# Patient Record
Sex: MALE | Race: WHITE
[De-identification: names, ages, dates, MRNs, and addresses within clinical notes are randomized per-mention and may not be internally consistent; named-entity substitution may affect disease eponyms.]

---

## 2019-01-22 ENCOUNTER — HOSPITAL ENCOUNTER (OUTPATIENT)
Dept: HOSPITAL 53 - M OPP | Age: 49
Discharge: HOME | End: 2019-01-22
Attending: SURGERY
Payer: COMMERCIAL

## 2019-01-22 VITALS — BODY MASS INDEX: 37.62 KG/M2 | WEIGHT: 254 LBS | HEIGHT: 69 IN

## 2019-01-22 VITALS — DIASTOLIC BLOOD PRESSURE: 98 MMHG | SYSTOLIC BLOOD PRESSURE: 148 MMHG

## 2019-01-22 DIAGNOSIS — Z86.010: ICD-10-CM

## 2019-01-22 DIAGNOSIS — D12.5: ICD-10-CM

## 2019-01-22 DIAGNOSIS — R13.10: ICD-10-CM

## 2019-01-22 DIAGNOSIS — F41.9: ICD-10-CM

## 2019-01-22 DIAGNOSIS — E78.5: ICD-10-CM

## 2019-01-22 DIAGNOSIS — G47.8: ICD-10-CM

## 2019-01-22 DIAGNOSIS — R12: ICD-10-CM

## 2019-01-22 DIAGNOSIS — Z87.891: ICD-10-CM

## 2019-01-22 DIAGNOSIS — K57.90: ICD-10-CM

## 2019-01-22 DIAGNOSIS — Z12.11: Primary | ICD-10-CM

## 2019-01-22 DIAGNOSIS — Z79.899: ICD-10-CM

## 2019-01-22 DIAGNOSIS — R06.83: ICD-10-CM

## 2019-01-22 DIAGNOSIS — R10.32: ICD-10-CM

## 2019-01-22 DIAGNOSIS — E66.9: ICD-10-CM

## 2019-01-22 DIAGNOSIS — K22.2: ICD-10-CM

## 2019-01-22 DIAGNOSIS — K21.9: ICD-10-CM

## 2019-01-22 PROCEDURE — 88305 TISSUE EXAM BY PATHOLOGIST: CPT

## 2019-01-22 PROCEDURE — 45380 COLONOSCOPY AND BIOPSY: CPT

## 2019-01-22 PROCEDURE — 43249 ESOPH EGD DILATION <30 MM: CPT

## 2019-01-22 NOTE — [facility]
________________________________________________________________________________

Patient Name: Abimael Camacho            Procedure Date: 1/22/2019 11:27 AM

MRN: [id number]                          Account Number: [id number]

YOB: 1970              Age: 48

Room: [facility]                            Gender: Male

Note Status: Finalized                 

________________________________________________________________________________

 

Procedure:           Upper GI endoscopy

Indications:         Dysphagia, Follow-up of esophageal reflux

Providers:           Xu Aquino MD

Referring MD:        JAYME MARQUEZ NP

Requesting Provider: 

Medicines:           Monitored Anesthesia Care

Complications:       No immediate complications.

________________________________________________________________________________

Procedure:           Pre-Anesthesia Assessment:

                     - Prior to the procedure, a History and Physical was 

                     performed, and patient medications and allergies were 

                     reviewed. The patient is competent. The risks and 

                     benefits of the procedure and the sedation options and 

                     risks were discussed with the patient. All questions were 

                     answered and informed consent was obtained. Patient 

                     identification and proposed procedure were verified by 

                     the physician, the nurse and the anesthetist in the 

                     procedure room. Mental Status Examination: alert and 

                     oriented. CV Examination: regular rate and rhythm. 

                     Prophylactic Antibiotics: The patient does not require 

                     prophylactic antibiotics. Prior Anticoagulants: The 

                     patient has taken no previous anticoagulant or 

                     antiplatelet agents. ASA Grade Assessment: II - A patient 

                     with mild systemic disease. After reviewing the risks and 

                     benefits, the patient was deemed in satisfactory 

                     condition to undergo the procedure. The anesthesia plan 

                     was to use monitored anesthesia care (MAC). Immediately 

                     prior to administration of medications, the patient was 

                     re-assessed for adequacy to receive sedatives. The heart 

                     rate, respiratory rate, oxygen saturations, blood 

                     pressure, adequacy of pulmonary ventilation, and response 

                     to care were monitored throughout the procedure. The 

                     physical status of the patient was re-assessed after the 

                     procedure.

                     The Endoscope was introduced through the mouth, and 

                     advanced to the second part of duodenum. The upper GI 

                     endoscopy was accomplished without difficulty. The 

                     patient tolerated the procedure well.

                                                                                

Findings:

     One benign-appearing, intrinsic moderate stenosis was found 43 cm from 

     the incisors. This stenosis measured less than one cm (in length). The 

     stenosis was traversed. A TTS dilator was passed through the scope. 

     Dilation with an 18-19-20 mm balloon dilator was performed to 18 mm. The 

     dilation site was examined following endoscope reinsertion and showed 

     moderate improvement in luminal narrowing. Estimated blood loss was 

     minimal.

     The entire examined stomach was normal.

     The first portion of the duodenum and second portion of the duodenum were 

     normal.

                                                                                

Impression:          - Benign-appearing esophageal stenosis. Dilated.

                     - Normal stomach.

                     - Normal first portion of the duodenum and second portion 

                     of the duodenum.

                     - No specimens collected.

Recommendation:      - Discharge patient to home.

                     - Resume previous diet.

                     - Continue present medications.

                                                                                

 

Xu Aquino MD

__________________

Xu Aquino MD

1/22/2019 12:24:40 PM

This report has been signed electronically.

Number of Addenda: 0

 

Note Initiated On: 1/22/2019 11:27 AM

Estimated Blood Loss:

     Estimated blood loss was minimal.

## 2019-01-22 NOTE — [facility]
________________________________________________________________________________

Patient Name: Abimael Camacho            Procedure Date: 1/22/2019 11:28 AM

MRN: [id number]                          Account Number: [id number]

YOB: 1970              Age: 48

Room: [facility]                            Gender: Male

Note Status: Finalized                 

________________________________________________________________________________

 

Procedure:           Colonoscopy

Indications:         High risk colon cancer surveillance: Personal history of 

                     non-advanced adenoma, Last colonoscopy: July 2012

Providers:           Xu Aquino MD

Referring MD:        JAYME MARQUEZ NP

Requesting Provider: 

Medicines:           Monitored Anesthesia Care

Complications:       No immediate complications.

________________________________________________________________________________

Procedure:           Pre-Anesthesia Assessment:

                     - Prior to the procedure, a History and Physical was 

                     performed, and patient medications and allergies were 

                     reviewed. The patient is competent. The risks and 

                     benefits of the procedure and the sedation options and 

                     risks were discussed with the patient. All questions were 

                     answered and informed consent was obtained. Patient 

                     identification and proposed procedure were verified by 

                     the physician, the nurse and the anesthesiologist in the 

                     procedure room. Mental Status Examination: alert and 

                     oriented. CV Examination: regular rate and rhythm. 

                     Prophylactic Antibiotics: The patient does not require 

                     prophylactic antibiotics. Prior Anticoagulants: The 

                     patient has taken no previous anticoagulant or 

                     antiplatelet agents. ASA Grade Assessment: II - A patient 

                     with mild systemic disease. After reviewing the risks and 

                     benefits, the patient was deemed in satisfactory 

                     condition to undergo the procedure. The anesthesia plan 

                     was to use monitored anesthesia care (MAC). Immediately 

                     prior to administration of medications, the patient was 

                     re-assessed for adequacy to receive sedatives. The heart 

                     rate, respiratory rate, oxygen saturations, blood 

                     pressure, adequacy of pulmonary ventilation, and response 

                     to care were monitored throughout the procedure. The 

                     physical status of the patient was re-assessed after the 

                     procedure.

                     The Colonoscope was introduced through the anus and 

                     advanced to the cecum, identified by appendiceal orifice 

                     and ileocecal valve. The colonoscopy was performed 

                     without difficulty. The patient tolerated the procedure 

                     well. The quality of the bowel preparation was excellent.

                                                                                

Findings:

     The perianal and digital rectal examinations were normal.

     Multiple medium-mouthed diverticula were found in the sigmoid colon, 

     descending colon and transverse colon.

     A 4 mm polyp was found at 45 cm proximal to the anus. The polyp was 

     sessile. The polyp was removed with a jumbo cold forceps. Resection and 

     retrieval were complete. Estimated blood loss was minimal.

                                                                                

Impression:          - Diverticulosis in the sigmoid colon, in the descending 

                     colon and in the transverse colon.

                     - One 4 mm polyp at 45 cm proximal to the anus, removed 

                     with a jumbo cold forceps. Resected and retrieved.

Recommendation:      - Discharge patient to home.

                     - Resume previous diet.

                     - Continue present medications.

                     - Await pathology results.

                     - If the pathology report reveals adenomatous tissue, 

                     then repeat the colonoscopy for surveillance in 5 years.

                                                                                

 

Xu Aquino MD

__________________

Xu Aquino MD

1/22/2019 1:14:10 PM

This report has been signed electronically.

Number of Addenda: 0

 

Note Initiated On: 1/22/2019 11:28 AM

Estimated Blood Loss:

     Estimated blood loss was minimal.